# Patient Record
Sex: MALE | Race: WHITE | URBAN - METROPOLITAN AREA
[De-identification: names, ages, dates, MRNs, and addresses within clinical notes are randomized per-mention and may not be internally consistent; named-entity substitution may affect disease eponyms.]

---

## 2022-11-23 ENCOUNTER — OFFICE VISIT (OUTPATIENT)
Dept: ENT CLINIC | Age: 32
End: 2022-11-23
Payer: COMMERCIAL

## 2022-11-23 VITALS
HEIGHT: 68 IN | DIASTOLIC BLOOD PRESSURE: 101 MMHG | HEART RATE: 116 BPM | WEIGHT: 220 LBS | BODY MASS INDEX: 33.34 KG/M2 | SYSTOLIC BLOOD PRESSURE: 167 MMHG

## 2022-11-23 DIAGNOSIS — H61.23 BILATERAL IMPACTED CERUMEN: Primary | ICD-10-CM

## 2022-11-23 PROCEDURE — 69210 REMOVE IMPACTED EAR WAX UNI: CPT | Performed by: OTOLARYNGOLOGY

## 2022-11-23 NOTE — PROGRESS NOTES
Patient presents for evaluation of cerumen impactions. He said that he was told that he had back wax impactions. He did have to have them cleaned out as a kid, but not as an adult. He denies any other significant ear history. He has had some mild otalgia on the left side. Procedure  Bilateral ear exam with cerumen removal  The right ear was visualized microscope. A dense cerumen impaction was removed with a [place] suction. Tympanic membrane was intact with an aerated middle ear    On the left side there was a dense cerumen impaction removed with a [place] suction. Tympanic membrane was intact with an aerated middle ear    Plan  Cerumen was removed today in clinic. The left was fairly severe. It could have caused the ear pain that he was describing. He was talked about a bit of dizziness and I would not expect cerumen impaction to cause this type of dizziness. The dizziness he was describing was not otologic in nature.   He will follow-up as needed or in 1 year for cerumen removal